# Patient Record
Sex: FEMALE
[De-identification: names, ages, dates, MRNs, and addresses within clinical notes are randomized per-mention and may not be internally consistent; named-entity substitution may affect disease eponyms.]

---

## 2019-02-13 ENCOUNTER — HOSPITAL ENCOUNTER (INPATIENT)
Dept: HOSPITAL 72 - SDSOVERFLO | Age: 64
LOS: 3 days | Discharge: HOME | DRG: 460 | End: 2019-02-16
Payer: COMMERCIAL

## 2019-02-13 VITALS — DIASTOLIC BLOOD PRESSURE: 75 MMHG | SYSTOLIC BLOOD PRESSURE: 133 MMHG

## 2019-02-13 VITALS — DIASTOLIC BLOOD PRESSURE: 69 MMHG | SYSTOLIC BLOOD PRESSURE: 131 MMHG

## 2019-02-13 VITALS — DIASTOLIC BLOOD PRESSURE: 73 MMHG | SYSTOLIC BLOOD PRESSURE: 132 MMHG

## 2019-02-13 VITALS — SYSTOLIC BLOOD PRESSURE: 127 MMHG | DIASTOLIC BLOOD PRESSURE: 73 MMHG

## 2019-02-13 VITALS — SYSTOLIC BLOOD PRESSURE: 125 MMHG | DIASTOLIC BLOOD PRESSURE: 81 MMHG

## 2019-02-13 VITALS — SYSTOLIC BLOOD PRESSURE: 115 MMHG | DIASTOLIC BLOOD PRESSURE: 68 MMHG

## 2019-02-13 VITALS — DIASTOLIC BLOOD PRESSURE: 73 MMHG | SYSTOLIC BLOOD PRESSURE: 127 MMHG

## 2019-02-13 VITALS — SYSTOLIC BLOOD PRESSURE: 126 MMHG | DIASTOLIC BLOOD PRESSURE: 74 MMHG

## 2019-02-13 VITALS — DIASTOLIC BLOOD PRESSURE: 56 MMHG | SYSTOLIC BLOOD PRESSURE: 115 MMHG

## 2019-02-13 VITALS — SYSTOLIC BLOOD PRESSURE: 115 MMHG | DIASTOLIC BLOOD PRESSURE: 56 MMHG

## 2019-02-13 VITALS — HEIGHT: 66 IN | WEIGHT: 160 LBS | BODY MASS INDEX: 25.71 KG/M2

## 2019-02-13 VITALS — SYSTOLIC BLOOD PRESSURE: 126 MMHG | DIASTOLIC BLOOD PRESSURE: 65 MMHG

## 2019-02-13 VITALS — DIASTOLIC BLOOD PRESSURE: 78 MMHG | SYSTOLIC BLOOD PRESSURE: 127 MMHG

## 2019-02-13 VITALS — SYSTOLIC BLOOD PRESSURE: 136 MMHG | DIASTOLIC BLOOD PRESSURE: 78 MMHG

## 2019-02-13 VITALS — DIASTOLIC BLOOD PRESSURE: 65 MMHG | SYSTOLIC BLOOD PRESSURE: 126 MMHG

## 2019-02-13 VITALS — DIASTOLIC BLOOD PRESSURE: 66 MMHG | SYSTOLIC BLOOD PRESSURE: 120 MMHG

## 2019-02-13 VITALS — DIASTOLIC BLOOD PRESSURE: 80 MMHG | SYSTOLIC BLOOD PRESSURE: 134 MMHG

## 2019-02-13 VITALS — SYSTOLIC BLOOD PRESSURE: 125 MMHG | DIASTOLIC BLOOD PRESSURE: 77 MMHG

## 2019-02-13 DIAGNOSIS — M54.16: Primary | ICD-10-CM

## 2019-02-13 DIAGNOSIS — R94.31: ICD-10-CM

## 2019-02-13 DIAGNOSIS — E78.5: ICD-10-CM

## 2019-02-13 DIAGNOSIS — G89.18: ICD-10-CM

## 2019-02-13 DIAGNOSIS — Z87.891: ICD-10-CM

## 2019-02-13 DIAGNOSIS — R51: ICD-10-CM

## 2019-02-13 DIAGNOSIS — Z85.3: ICD-10-CM

## 2019-02-13 DIAGNOSIS — E66.9: ICD-10-CM

## 2019-02-13 DIAGNOSIS — M53.2X6: ICD-10-CM

## 2019-02-13 PROCEDURE — 01NB0ZZ RELEASE LUMBAR NERVE, OPEN APPROACH: ICD-10-PCS

## 2019-02-13 PROCEDURE — 87081 CULTURE SCREEN ONLY: CPT

## 2019-02-13 PROCEDURE — 94003 VENT MGMT INPAT SUBQ DAY: CPT

## 2019-02-13 PROCEDURE — 86900 BLOOD TYPING SEROLOGIC ABO: CPT

## 2019-02-13 PROCEDURE — 36415 COLL VENOUS BLD VENIPUNCTURE: CPT

## 2019-02-13 PROCEDURE — 86850 RBC ANTIBODY SCREEN: CPT

## 2019-02-13 PROCEDURE — 72020 X-RAY EXAM OF SPINE 1 VIEW: CPT

## 2019-02-13 PROCEDURE — 76000 FLUOROSCOPY <1 HR PHYS/QHP: CPT

## 2019-02-13 PROCEDURE — 94150 VITAL CAPACITY TEST: CPT

## 2019-02-13 PROCEDURE — 86901 BLOOD TYPING SEROLOGIC RH(D): CPT

## 2019-02-13 PROCEDURE — 0SG00K1 FUSION OF LUMBAR VERTEBRAL JOINT WITH NONAUTOLOGOUS TISSUE SUBSTITUTE, POSTERIOR APPROACH, POSTERIOR COLUMN, OPEN APPROACH: ICD-10-PCS

## 2019-02-13 PROCEDURE — 4A11X4G MONITORING OF PERIPHERAL NERVOUS ELECTRICAL ACTIVITY, INTRAOPERATIVE, EXTERNAL APPROACH: ICD-10-PCS

## 2019-02-13 RX ADMIN — DEXTROSE AND SODIUM CHLORIDE SCH MLS/HR: 5; .45 INJECTION, SOLUTION INTRAVENOUS at 21:23

## 2019-02-13 RX ADMIN — DIPHENHYDRAMINE HYDROCHLORIDE PRN MG: 50 INJECTION INTRAMUSCULAR; INTRAVENOUS at 21:17

## 2019-02-13 RX ADMIN — SODIUM CHLORIDE SCH MLS/HR: 0.9 INJECTION INTRAVENOUS at 23:44

## 2019-02-13 RX ADMIN — SODIUM CHLORIDE SCH MLS/HR: 0.9 INJECTION INTRAVENOUS at 14:39

## 2019-02-13 RX ADMIN — DEXTROSE AND SODIUM CHLORIDE SCH MLS/HR: 5; .45 INJECTION, SOLUTION INTRAVENOUS at 14:39

## 2019-02-13 NOTE — PRE-PROCEDURE NOTE/ATTESTATION
Pre-Procedure Note/Attestation


Complete Prior to Procedure


Planned Procedure:  not applicable


Procedure Narrative:


Laminectomy decompression L3-L4, L4-L5


Posterolateral fusion L3-L4





Indications for Procedure


Pre-Operative Diagnosis:


Trauma radiculopathy spodylolisthesis





Attestation


I attest that I discussed the nature of the procedure; its benefits; risks and 

complications; and alternatives (and the risks and benefits of such alternatives

), prior to the procedure, with the patient (or the patient's legal 

representative).





I attest that, if there was a reasonable possibility of needing a blood 

transfusion, the patient (or the patient's legal representative) was given the 

[place] Department [employer] Services standardized written summary, pursuant 

to the [place] Blood Safety Act ([place] Health and Safety Code # 1645, as 

amended).





I attest that I re-evaluated the patient just prior to the surgery and that 

there has been no change in the patient's H&P, except as documented below:











Jeffry Patiño MD Feb 13, 2019 07:01

## 2019-02-13 NOTE — NUR
NURSE NOTES:

Received report from Cadence RN, pt a/a/o x4 laying in bed with no signs of distress or other 
issues at this time. surgical dressing dry and intact. SCD's in place. IS at bed side pt was 
able to demonstrate back. IV on the right hand gauge#18 running LR, RN will change IVF as 
indicated by MD. RN will carry on orders. call light within reach. bed in lowest position. 
side rales up x2. I will f/u as needed.

## 2019-02-13 NOTE — IMMEDIATE POST-OP EVALUATION
Immediate Post-Op Evalulation


Immediate Post-Op Evalulation


Procedure:  L3-4, L4-5 Microdiscectomy, Laminectomy, Fusion


Date of Evaluation:  2019


Time of Evaluation:  10:35


IV Fluids:  1000 LR


Blood Products:  0


Estimated Blood Loss:  50


Urinary Output:  0


Blood Pressure Systolic:  134


Blood Pressure Diastolic:  80


Pulse Rate:  78


Respiratory Rate:  16


O2 Sat by Pulse Oximetry:  91


Temperature (Fahrenheit):  97.5


Pain Score (1-10):  2


Nausea:  No


Vomiting:  No


Complications


0


Patient Status:  awake, reacts, patent, extubated, none


Hydration Status:  adequate


Dru Grams Ancef IV


Given Within 1 Hr of Incision:  Yes


Time Given:  07:16











Vitaly Walter MD 2019 06:49

## 2019-02-13 NOTE — DIAGNOSTIC IMAGING REPORT
INDICATION:  Pain, intraoperative

 

TECHNIQUE: Intraoperative imaging

 

Fluoroscopy time: 14.1 seconds

Total dose: 0.09521 mGym2

Total number of images: One

 

COMPARISON: None

 

FINDINGS: Intraoperative imaging demonstrates surgical tool posterior to what is

presumably the L4 vertebral body.

 

IMPRESSION: Intraoperative imaging, as described
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
## 2019-02-13 NOTE — OPERATIVE NOTE - DICTATED
DATE OF OPERATION:  02/13/2019

SURGEON:  Jeffry Patiño M.D.



ASSISTANT:  DONAL Bolaños.



ANESTHESIOLOGIST:  Vitaly Walter M.D.



ANESTHESIA:  General with intubation.



ADMITTING/PREOPERATIVE DIAGNOSIS:  Trauma, radiculopathy, neurologic

deficit of lower extremities.



POSTOPERATIVE DIAGNOSIS:  Trauma, radiculopathy, neurologic deficit of

lower extremities.



PROCEDURE:

1. The patient's body habitus greater than 95th percentile for height.

Local anesthetic applied by surgeon.

2. Intraoperative x-rays interpreted by surgeon.

3. High-powered magnification dissection.

4. Laminectomy L3, L4, L5.

5. Posterolateral fusion, L3-L4.

6. SSEP monitoring.



ESTIMATED BLOOD LOSS:  50 mL.



DRAINS:  None.



COMPLICATIONS:  None.



SPECIMEN:  Bony fragments to pathology.



DESCRIPTION OF PROCEDURE:  The patient was brought to the operating room

and in the supine position, general anesthesia with intubation was

induced.  IV antibiotics, IV Decadron were administered 30 minutes prior

to incision time.  The patient has carefully turned and positioned on

prone position.  Lumbodorsal spine was sterilely prepped.  Spinal needle

was sterilely placed in the subcutaneous tissue only by surgeon and a

cross-table imaging was obtained under sterile conditions demonstrating

the correct level for incision placement.  Needle was removed.  Back was

re-sterilely prepped and draped free in usual sterile fashion.



A longitudinal midline incision over the appropriate interval was

sharply placed at the dermis and epidermis.  Electrocautery dissection was

carried through the subcutaneous tissue to the level of lumbodorsal fascia

that was incised right and left of midline over the respective lamina of

L3, L4, and L5.  Marker was placed.  Cross-table imaging obtained

confirming levels.  Position of the markers recorded, markers removed.



High-power magnification, laminectomies of L3, L4, L5 with decompression

severe, compression was noted, spondylolisthesis at L3-L4.  No dural tears

or leaks anytime during the procedure.  SSEP monitoring stable at all

times.



Transverse processes of L3, L4, with isolated confirmed in position with

cross-table imaging under sterile conditions, and markers in place.

Decorticated over posterior aspects with allograft in combination with

osteopromotive material placed between the L3 and L4 transverse processes

bilaterally.  This was placed after the wound was copiously irrigated with

antibiotic-containing saline and the dura was overlying with FloSeal.



Sequential reapproximation was undertaken of the paraspinal muscles,

lumbodorsal fascia, and  subcutaneous tissue with Vicryl suture material.

Staple sutures utilized for reapproximation dermis and epidermis.  Local

anesthetic applied by surgeon to subcutaneous interval between the dermis

and subcutaneous tissue bilateral lateral aspects of the wound.  Sterile

bandage applied maintained in place with tape.



The patient was carefully turned from the prone to the supine position

on the transport bed where she was awakened, extubated in the operating

room, and transported to postop recovery in good stable condition.









  ______________________________________________

  Jeffry Patiño M.D.





DR:  Mari

D:  02/13/2019 10:52

T:  02/13/2019 18:15

JOB#:  [id number]

CC:

## 2019-02-13 NOTE — ANETHESIA PREOPERATIVE EVAL
Anesthesia Pre-op PMH/ROS


General


Date of Evaluation:  2019


Time of Evaluation:  07:01


Anesthesiologist:  Saba


ASA Score:  ASA 3


Mallampati Score


Class I : Soft palate, uvula, fauces, pillars visible


Class II: Soft palate, uvula, fauces visible


Class III: Soft palate, base of uvula visible


Class IV: Only hard plate visible


Mallampati Classification:  Class II


Surgeon:  Haroon


Diagnosis:  Back Pain


Surgical Procedure:  L3-4, L4-5 Microdiscectomy, Laminectomy, Fusion


Anesthesia History:  none


Family History:  no anesthesia problems


Allergies:  


Coded Allergies:  


     No Known Allergies (Unverified , 19)


Medications:  see eMAR


Patient NPO?:  Yes


NPO Date:  2019


NPO Time:  2100





Past Medical History


Cardiovascular:  Reports: HTN, other - HL


Hematology/Immune:  Reports: other - L Breast CA-Lumpectomy, Radiation Tx


PSxH Narrative:


Hemorrhoidectomy, VIJAY/BSO, L Breast Lumpectomy





Anesthesia Pre-op Phys. Exam


Physician Exam





Last Vital Signs








  Date Time  Temp Pulse Resp B/P (MAP) Pulse Ox O2 Delivery O2 Flow Rate FiO2


 


19 06:03      Room Air  


 


19 05:52 97.8 71 18 131/69 (89) 100   








Constitutional:  NAD


Neurologic:  CN 2-12 intact


Cardiovascular:  RRR


Respiratory:  CTA


Gastrointestinal:  S/NT/ND





Airway Exam


Mallampati Score:  Class II


MO:  limited


ROM:  limited


Teeth:  missing, intact





Anesthesia Pre-op A/P


Risk Assessment & Plan


Assessment:


ASA 3


Plan:


GA, SED, GlideScope GO


Status Change Before Surgery:  No





Pre-Antibiotics


Dru Grams Ancef IV


Given Within 1 Hr of Incision:  Yes


Time Given:  07:16











Vitaly Walter MD 2019 06:25

## 2019-02-13 NOTE — BRIEF OPERATIVE NOTE
Immediate Post Operative Note


Operative Note


Pre-op Diagnosis:


Trauma radiculopathy spodylolisthesis


Procedure:


Laminectomy L3, L4, L5


Posterolateral fusion L3-L4


Xray


Magnification


Local


SSEP


Body Habitus


Post-op Diagnosis:  same as pre-op


Findings:  consistent w/pre-op dx studies


Surgeon:  Haroon Ph.D., M.D.


Assistant:  Angela MANSFIELD


Anesthesiologist:  Saba WOLFF


Anesthesia:  general


Specimen:  yes


Complications:  none


Condition:  stable


Fluids:  anesthesia


Estimated Blood Loss:  volume


Drains:  none


Implant(s) used?:  No











Jeffry Patiño MD Feb 13, 2019 10:42

## 2019-02-14 VITALS — DIASTOLIC BLOOD PRESSURE: 68 MMHG | SYSTOLIC BLOOD PRESSURE: 143 MMHG

## 2019-02-14 VITALS — DIASTOLIC BLOOD PRESSURE: 67 MMHG | SYSTOLIC BLOOD PRESSURE: 128 MMHG

## 2019-02-14 VITALS — DIASTOLIC BLOOD PRESSURE: 66 MMHG | SYSTOLIC BLOOD PRESSURE: 133 MMHG

## 2019-02-14 VITALS — DIASTOLIC BLOOD PRESSURE: 74 MMHG | SYSTOLIC BLOOD PRESSURE: 115 MMHG

## 2019-02-14 RX ADMIN — DIPHENHYDRAMINE HYDROCHLORIDE PRN MG: 50 INJECTION INTRAMUSCULAR; INTRAVENOUS at 08:41

## 2019-02-14 RX ADMIN — OXYCODONE HYDROCHLORIDE PRN MG: 5 TABLET ORAL at 22:48

## 2019-02-14 RX ADMIN — DEXTROSE AND SODIUM CHLORIDE SCH MLS/HR: 5; .45 INJECTION, SOLUTION INTRAVENOUS at 06:43

## 2019-02-14 RX ADMIN — DIPHENHYDRAMINE HYDROCHLORIDE PRN MG: 50 INJECTION INTRAMUSCULAR; INTRAVENOUS at 20:11

## 2019-02-14 RX ADMIN — SODIUM CHLORIDE SCH MLS/HR: 0.9 INJECTION INTRAVENOUS at 07:30

## 2019-02-14 RX ADMIN — DIPHENHYDRAMINE HYDROCHLORIDE PRN MG: 50 INJECTION INTRAMUSCULAR; INTRAVENOUS at 10:52

## 2019-02-14 RX ADMIN — OXYCODONE HYDROCHLORIDE PRN MG: 5 TABLET ORAL at 00:32

## 2019-02-14 RX ADMIN — OXYCODONE HYDROCHLORIDE PRN MG: 5 TABLET ORAL at 17:50

## 2019-02-14 RX ADMIN — DEXTROSE AND SODIUM CHLORIDE SCH MLS/HR: 5; .45 INJECTION, SOLUTION INTRAVENOUS at 16:43

## 2019-02-14 RX ADMIN — DEXTROSE AND SODIUM CHLORIDE SCH MLS/HR: 5; .45 INJECTION, SOLUTION INTRAVENOUS at 22:41

## 2019-02-14 RX ADMIN — DIPHENHYDRAMINE HYDROCHLORIDE PRN MG: 50 INJECTION INTRAMUSCULAR; INTRAVENOUS at 16:47

## 2019-02-14 RX ADMIN — DIPHENHYDRAMINE HYDROCHLORIDE PRN MG: 50 INJECTION INTRAMUSCULAR; INTRAVENOUS at 14:22

## 2019-02-14 NOTE — PROGRESS NOTE
DATE:  02/14/2019

ACUTE PAIN MANAGEMENT PHYSICIAN PROGRESS NOTE



MEDICATIONS:  Medications administration record reviewed.  Medications

include Phenergan Chloraseptic spray, oxycodone, Zofran, Narcan, Dilaudid,

Pepcid, Benadryl, Soma, Mylanta, Fioricet, Tylenol.



LABORATORY STUDIES:  No interval laboratory studies.  Vital signs within

normal limits.  Afebrile, pulse 90, respirations 19, blood pressure

136/78, oxygen saturation 98% on supplemental oxygen.



I spent over 60 minutes in consultation today.  I saw the patient at

bedside.  I discussed the case with the nurse, JOSE Tyler.  There have

been no episodes of respiratory difficulties with this heavy tobacco user

on opioid narcotics.  She has been receiving Dilaudid injections without

any adverse side effects.  She also has received Fioricet for headaches

along with oral oxycodone 10 mg. All the doses have been well tolerated

without any nausea symptoms.  I will streamline her medication list to

reduce the risk of medication administration errors.  I will discontinue

Soma and low doses of oxycodone which have not been necessary so far at

this time.  She will continue on twice a day Pepcid for GI ulcer

prophylaxis.  The patient will work with physical therapy training.  An

incentive spirometer usage was encouraged and she will continue with

sequential compression pneumatic foot devices for DVT prophylaxis.



Her current analgesic regimen appears adequate.  She already has a

supply of pain medications for home usage.  I will continue her IV fluids

for now until her diet is well advanced.  Overall the patient is

progressing well.  She will continue with supportive care here in the

hospital.  Attending discharge planning by the surgeon.  The patient is

able to sleep at-times without any over sedation noted.









  ______________________________________________

  Ej Garnett M.D.





DR:  Pablo

D:  02/14/2019 07:14

T:  02/14/2019 14:34

JOB#:  [id number]

CC:

## 2019-02-14 NOTE — NUR
NURSE NOTES:

Received patient in bed awake and alert x4, no s/s distress noted. Back surgical dressing 
clean, dry and intact. Due prn pain medication given for pain. Bedside commode placed next 
to bedside. Bed in lowest position for safety. Call light within reach.

## 2019-02-14 NOTE — NUR
CASE MANAGEMENT:INITIAL REVIEW 



63 YO F PRESENTED TO OUR ED FROM HOME 



CC: BACK PAIN 



PMHx: BREAST CA. BACK PAIN. 



SI:SPONDYLOLISTHESIS. 

T 97.8 HR 71 RR 18 B/P 131/69 SATS 100% ON RA 

NO LABS TODAY 



IS: OR MEDS



***PATIENT ADMITTED TO MED/SURG 2/13/2019 @ 1043****



DCP:PATIENT TO BE DISCHARGED TO HOME ONCE MEDICALLY CLEARED. 



PLAN OF CARE: 

ADMITTING/PREOPERATIVE DIAGNOSIS:  Trauma, radiculopathy, neurologic

deficit of lower extremities.



POSTOPERATIVE DIAGNOSIS:  Trauma, radiculopathy, neurologic deficit of

lower extremities.



PROCEDURE:

1. The patient's body habitus greater than 95th percentile for height.

Local anesthetic applied by surgeon.

2. Intraoperative x-rays interpreted by surgeon.

3. High-powered magnification dissection.

4. Laminectomy L3, L4, L5.

5. Posterolateral fusion, L3-L4.

6. SSEP monitoring.

-------------------------------------------------------------------------------

Addendum: 02/16/19 at 0832 by Jennifer Jeff CM

-------------------------------------------------------------------------------

INTERQAL MET FOR ACUTE

## 2019-02-14 NOTE — NUR
NURSE NOTES:

 Notified by PT that pt's has some drainage on sheet from surgical site.

1045: Assessed surgical site, moderate serosanguineous fluid saturating dressing and through 
pt's gown into bed. Asked for Charge Rn Carlo to also assess site.

1055: Notified Dr. Patiño of surgical site drainage. Dr. Patiño ordered pt to be placed on 
stomach, sterile removal of surgical dressing, cleansed with Betadine, apply 2 abdominal 
pads folded for 6 thickness, cover with Medipore surgical tape, apply pressure. Pt is on 
bedrest with only bathroom privileges, hold discharge.



1120: Dressing changed done with Carlo, Charge RN, well tolerated by pt.

## 2019-02-14 NOTE — NUR
P.T Note : late entry 1045

P.T evaluation completed and treatment initiated per spinal protocol. Please refer to P.T 
evaluation for current functional status. Skilled P.T service is warranted to ensure safety 
and compliance with spinal precautions in performing functional mobilities. Thank you for 
this referral.

Addendum: noted significant amt. of  bloody drainage coming out from dressing  post P.T 
evaluation. RN notified.

## 2019-02-14 NOTE — 48 HOUR POST ANESTHESIA EVAL
Post Anesthesia Evaluation


Procedure:  L3-4, L4-5 Microdiscectomy, Laminectomy, Fusion


Date of Evaluation:  Feb 14, 2019


Time of Evaluation:  08:43


Blood Pressure Systolic:  122


0:  64


Pulse Rate:  74


Respiratory Rate:  22


Temperature (Fahrenheit):  97.6


O2 Sat by Pulse Oximetry:  96


Airway:  patent


Nausea:  No


Vomiting:  No


Pain Intensity:  3


Hydration Status:  adequate


Cardiopulmonary Status:


stable


Mental Status/LOC:  patient returned to baseline


Follow-up Care/Observations:


n/a


Post-Anesthesia Complications:


none


Follow-up care needed:  N/A











Gato Encarnacion MD Feb 14, 2019 08:44

## 2019-02-14 NOTE — NUR
NURSE NOTES:

Received report from JOSE Clarke. Pt in bed, awake, talkative, no apparent distress noted, 
call light within reach, bed in lowest position.

## 2019-02-15 VITALS — DIASTOLIC BLOOD PRESSURE: 59 MMHG | SYSTOLIC BLOOD PRESSURE: 119 MMHG

## 2019-02-15 VITALS — SYSTOLIC BLOOD PRESSURE: 154 MMHG | DIASTOLIC BLOOD PRESSURE: 69 MMHG

## 2019-02-15 VITALS — SYSTOLIC BLOOD PRESSURE: 134 MMHG | DIASTOLIC BLOOD PRESSURE: 66 MMHG

## 2019-02-15 VITALS — DIASTOLIC BLOOD PRESSURE: 76 MMHG | SYSTOLIC BLOOD PRESSURE: 132 MMHG

## 2019-02-15 VITALS — DIASTOLIC BLOOD PRESSURE: 63 MMHG | SYSTOLIC BLOOD PRESSURE: 133 MMHG

## 2019-02-15 VITALS — SYSTOLIC BLOOD PRESSURE: 129 MMHG | DIASTOLIC BLOOD PRESSURE: 72 MMHG

## 2019-02-15 RX ADMIN — OXYCODONE HYDROCHLORIDE PRN MG: 5 TABLET ORAL at 11:46

## 2019-02-15 RX ADMIN — OXYCODONE HYDROCHLORIDE PRN MG: 5 TABLET ORAL at 15:17

## 2019-02-15 RX ADMIN — OXYCODONE HYDROCHLORIDE PRN MG: 5 TABLET ORAL at 01:48

## 2019-02-15 RX ADMIN — OXYCODONE HYDROCHLORIDE PRN MG: 5 TABLET ORAL at 08:48

## 2019-02-15 RX ADMIN — OXYCODONE HYDROCHLORIDE PRN MG: 5 TABLET ORAL at 18:22

## 2019-02-15 RX ADMIN — OXYCODONE HYDROCHLORIDE PRN MG: 5 TABLET ORAL at 05:28

## 2019-02-15 RX ADMIN — OXYCODONE HYDROCHLORIDE PRN MG: 5 TABLET ORAL at 21:57

## 2019-02-15 NOTE — NUR
Pt in bed a/o x 4. Pt is  calm and relaxed using her cell phone. Pt had dinner, tolerated 
well. Pt left with dressing D/C/I. Pt left in bed in low position, call light within reach, 
pt close to nurses station.

## 2019-02-15 NOTE — NUR
NURSE NOTES:Patient received from Ej LANDIS patient denies any pain at this time .no s/s of 
distress noted .patient  RW g#24  H/L  patent and intact  lower lumbar back dressing C/D/I . 
Patient on SCDS  in placed .patient encourage to use  IS when needed And patient tolerated 
well. Patient pain controlled with pain meds  every 3 hrs  when needed   and with good 
relief . call light wihin reach . bed in low position at all times.

## 2019-02-15 NOTE — NUR
NURSE NOTES:

Received call from Dr. Patiño, states the pt called his line c/o service provided at 
hospital, that her dressing was dirty and she was "bleeding out." Also that she had pain and 
we were not taking care of her pain needs. Pts dressing has been checked every hour, 
dressing is D/C/I, no drainage seeping through. Gi is witness that dressing is D/C/I. 
Pain medication has been given q3 hours on the hour, refer to EMR. Discussed with pt calling 
Dr. Garnett to discuss potential change in pain medication or increase dose, the patient 
refuses. She does not want her pain medication changed nor increased. Pt educated to let us 
know her needs and that she can speak to charge nurse if she feels her needs are not being 
met. Pt does not want to speak with charge. Educated to not call Dr. Patiño line (per Dr. Patiño). Took picture of pts dressing on her phone so she can see herself that her dressing 
is D/C/I. Pt felt more comfortable after seeing her dressing clean. Will continue to 
monitor. Pt left in bed in low position, call light within reach, skid socks on.   

-------------------------------------------------------------------------------

Addendum: 02/15/19 at 1817 by Maggie Chairez RN

-------------------------------------------------------------------------------

Pt has been refusing Dilaudid, states the medication does not help her. Dr. Garnett made aware 
during rounds. Per him continue with Roxycodone 10mg q 3 hours, that has been done.

## 2019-02-15 NOTE — PROGRESS NOTE
DATE:  02/15/2019

ACUTE PAIN MANAGEMENT PHYSICIAN PROGRESS NOTE:



MEDICATIONS:  Medication administration record reviewed.  Medications

include Phenergan, Chloraseptic spray, oxycodone, Zofran, Narcan,

Dilaudid, Pepcid, Benadryl, Soma, Mylanta, Fioricet, Tylenol.



LABORATORY STUDIES:  No interval laboratory studies.



OBJECTIVE:

VITAL SIGNS:  Afebrile, pulse 92, respirations 20, blood pressure 133/63,

oxygen saturation 94% on room air.



I saw the patient at the bedside with the nurse RN, Maggie.  I spent over

60 minutes in consultation today.  The patient's lumbar spine wound

continues to drain somewhat.  The surgeon, Dr. Patiño is well-aware, and

has recommended the patient remain in the hospital until the drainage

decreases.  Dr. Patiño has placed the patient on bedrest except for

bathroom privileges to the commode to help speed the healing time.  The

patient does still complain of headaches.  I have renewed the Fioricet

medication.  The patient has been using oxycodone 10 mg, which I would

continue at its current frequency dosing of every 3 hours p.r.n.  The

patient has not yet had a bowel movement.  She is passing flatus.  I have

asked the nurse to place prune juice at the bedside and I will also dose

her with milk of magnesia, x1 now.  The patient has been compliant with

her incentive spirometer.  She denies any nausea symptoms.  There have

been no episodes of oxygen desaturation.  We will continue supportive

care.  Watch for decrease in her lumbar wound drainage.









  ______________________________________________

  Ej Garnett M.D.





DR:  SUZANNE

D:  02/15/2019 11:29

T:  02/15/2019 16:01

JOB#:  [id number]

CC:

## 2019-02-15 NOTE — NUR
NURSE NOTES:

Notified Dr. Patiño the pts surgical site dressing is saturated with serosanguineous 
drainage, went through 4 layers of abd pads. Wound edges are well approximated, with 
exception of second to last staple appears to no longer be in place.   Per Dr. Patiño apply 
betadine, abd pads x2, and medipore tape. Keep on bedrest at 45 degrees. No bed pan, use 
BSC. No PT today. Pt will stay here today. Flu on discharge tomorrow. Will continue to 
monitor

## 2019-02-15 NOTE — NUR
NURSE NOTES:

Pt in bed a/o x 4 c/o pain to lower back. Dressing to lower back D/C/I. Pt has intact 
sensation bilaterally, 2+ pedal pulses, pt able to wiggle toes. R wrist IV site c/d/i 
running fluids as ordered. IS and BSC at bedside. Discussed with pt care plan for today,and 
pain management plan. Pt left in bed in low position, call light within reach, skid socks 
on. Will continue to monitor.

## 2019-02-16 VITALS — DIASTOLIC BLOOD PRESSURE: 76 MMHG | SYSTOLIC BLOOD PRESSURE: 138 MMHG

## 2019-02-16 VITALS — SYSTOLIC BLOOD PRESSURE: 131 MMHG | DIASTOLIC BLOOD PRESSURE: 60 MMHG

## 2019-02-16 VITALS — DIASTOLIC BLOOD PRESSURE: 77 MMHG | SYSTOLIC BLOOD PRESSURE: 130 MMHG

## 2019-02-16 VITALS — SYSTOLIC BLOOD PRESSURE: 116 MMHG | DIASTOLIC BLOOD PRESSURE: 73 MMHG

## 2019-02-16 RX ADMIN — OXYCODONE HYDROCHLORIDE PRN MG: 5 TABLET ORAL at 09:17

## 2019-02-16 RX ADMIN — OXYCODONE HYDROCHLORIDE PRN MG: 5 TABLET ORAL at 05:49

## 2019-02-16 RX ADMIN — OXYCODONE HYDROCHLORIDE PRN MG: 5 TABLET ORAL at 02:12

## 2019-02-16 NOTE — NUR
CASE MANAGEMENT: REVIEW 



2/16/2019



SI:SPONDYLOLISTHESIS.

T  98.3 HR 90 RR 19 B/P 116/73 SATS 94% ON 3L/NC 

NO LABS TODAY 



IS: PEPCID PO BID 





***MED/SURG STATUS*** 



DCP:PATIENT TO BE DISCHARGED TO HOME ONCE MEDICALLY CLEARED. 



PLAN OF CARE: 

WOUND CARE

## 2019-02-16 NOTE — NUR
NURSE NOTES:

Patient left the floor around 1600, accompanied by family member and nursing assistant. 
Patient's surgical cite dressing changed before discharge. Patient's name tag removed upon 
discharge. Patient's belongings crossed matched and signed by nurse and patient. Printed 
material given regarding patient's diagnosis and when to seek medical help. Walker and 
toilet sit given to patient upon discharge.

## 2019-02-16 NOTE — NUR
NURSE NOTES:DR. Garnett Called with new orders . patient oxygen d/c and  use BSC only.as MD 
ordered.

## 2019-02-16 NOTE — NUR
NURSE NOTES:

I received a discharge order from Dr Patiño, also MD ordered OK to discharge patient 
without Physical Therapy.

## 2019-02-16 NOTE — PROGRESS NOTE
DATE:  02/16/2019

ACUTE PAIN MANAGEMENT PHYSICIAN PROGRESS NOTE:



OBJECTIVE:

VITAL SIGNS:  Afebrile, pulse 85, respirations 20, blood pressure 131/60,

oxygen saturation 95% on room air.



LABORATORY STUDIES:  No interval laboratory studies.



MEDICATIONS:  Medication administration record reviewed.  Medications

include Pepcid, Narcan, Tylenol.  PRN medications include Soma, oxycodone

10 mg, Zofran, Dilaudid, Benadryl, Fioricet, Chloraseptic spray, Mylanta,

milk of magnesia.



I spent over 60 minutes in consultation today.  I discussed the case

with the overnight nurse and saw the patient at bedside.  The patient

remained in the hospital yesterday as she had continued drainage from the

lumbar spine wound.  The surgeon, Dr. Patiño placed the patient on

relative bedrest with only privileges for the bedside commode in order to

help reduce the lumbar wound drainage.  The drainage has decreased

considerably and the nurse just changed the dressing earlier this morning

revealing only a small quarter-sized area on the bandage of drainage.



Dr. Patiño will continue to evaluate and decide the best treatment for

the lumbar spine wound drainage.  The patient is afebrile and has been

consistently using her incentive spirometer.



The patient is breathing comfortably on room air.  The patient is

voiding urine well.  She used prune juice, but still did not have any

bowel movement.  I will order a Dulcolax suppository this morning.  I have

added p.r.n. magnesium citrate.  We have also added Colace to her b.i.d.

scheduled regimen.



The patient's pain levels have been well controlled using the oral

oxycodone pills.  I have streamlined her medication list and discontinued

previous orders for Soma, which have not been necessary so far.  The

patient will remain on Pepcid b.i.d. while she remains in the hospital.

We will continue to advance her ambulation as tolerated with physical

therapy training sessions while she remains in the hospital.









  ______________________________________________

  Ej Garnett M.D.





DRAdrianna PALACIOS

D:  02/16/2019 07:37

T:  02/16/2019 07:56

JOB#:  [id number]

CC:

## 2019-02-16 NOTE — NUR
NURSE NOTES:

Patient alert x4, on room air, no sign of distress and shortness of breathing. No sign of 
chest pain. Dressing at the Laminectomy dry and intact, will change if dressing soiled. 
Order received by PM nurseJohn Paul to put patient on Room air, and also to use only bed side 
commod and No bathroom privilege. Bed alarm on, at lowest position, side rails up x2, breaks 
engaged. IV at Right wrist, flushes well. Will give pain medications as order. Call light 
within reach, will keep monitoring.

## 2019-02-16 NOTE — NUR
NURSE NOTES:

Patient's IV is out, try to get another IV on her, tried once, patient couldn't allow me to 
try another one. Charge nurse Radha is aware.

## 2019-02-16 NOTE — NUR
NURSE NOTES:

I called and left a voice message to Dr Garnett regarding patient's discharge plane and if MD whitfield patient to continue with home medications. Waiting for order.
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
